# Patient Record
Sex: FEMALE | Race: WHITE | NOT HISPANIC OR LATINO | URBAN - METROPOLITAN AREA
[De-identification: names, ages, dates, MRNs, and addresses within clinical notes are randomized per-mention and may not be internally consistent; named-entity substitution may affect disease eponyms.]

---

## 2021-07-28 ENCOUNTER — OFFICE VISIT (OUTPATIENT)
Dept: URBAN - METROPOLITAN AREA CLINIC 70 | Facility: CLINIC | Age: 81
End: 2021-07-28
Payer: MEDICARE

## 2021-07-28 ENCOUNTER — LAB OUTSIDE AN ENCOUNTER (OUTPATIENT)
Dept: URBAN - METROPOLITAN AREA CLINIC 70 | Facility: CLINIC | Age: 81
End: 2021-07-28

## 2021-07-28 ENCOUNTER — WEB ENCOUNTER (OUTPATIENT)
Dept: URBAN - METROPOLITAN AREA CLINIC 70 | Facility: CLINIC | Age: 81
End: 2021-07-28

## 2021-07-28 VITALS
TEMPERATURE: 97.9 F | DIASTOLIC BLOOD PRESSURE: 67 MMHG | WEIGHT: 126 LBS | BODY MASS INDEX: 20.25 KG/M2 | SYSTOLIC BLOOD PRESSURE: 112 MMHG | HEART RATE: 84 BPM | HEIGHT: 66 IN

## 2021-07-28 DIAGNOSIS — K59.09 CHRONIC CONSTIPATION: ICD-10-CM

## 2021-07-28 DIAGNOSIS — K59.00 COLONIC CONSTIPATION: ICD-10-CM

## 2021-07-28 DIAGNOSIS — R63.4 WEIGHT LOSS: ICD-10-CM

## 2021-07-28 DIAGNOSIS — D13.2 ADENOMATOUS DUODENAL POLYP: ICD-10-CM

## 2021-07-28 DIAGNOSIS — R10.12 LEFT UPPER QUADRANT PAIN: ICD-10-CM

## 2021-07-28 PROCEDURE — 99214 OFFICE O/P EST MOD 30 MIN: CPT | Performed by: INTERNAL MEDICINE

## 2021-07-28 RX ORDER — ATORVASTATIN CALCIUM 40 MG/1
TAKE 1 TABLET (40 MG) BY ORAL ROUTE ONCE DAILY TABLET, FILM COATED ORAL 1
Qty: 0 | Refills: 0 | Status: ACTIVE | COMMUNITY
Start: 1900-01-01

## 2021-07-28 RX ORDER — ZOLPIDEM TARTRATE 10 MG/1
TAKE 1 TABLET (10 MG) BY ORAL ROUTE ONCE DAILY AT BEDTIME TABLET, FILM COATED ORAL 1
Qty: 0 | Refills: 0 | Status: ACTIVE | COMMUNITY
Start: 1900-01-01

## 2021-07-28 RX ORDER — MULTIVITAMIN WITH IRON
TAKE 1 TABLET BY ORAL ROUTE DAILY TABLET ORAL 1
Qty: 0 | Refills: 0 | Status: ACTIVE | COMMUNITY
Start: 1900-01-01

## 2021-07-28 RX ORDER — AMITRIPTYLINE HYDROCHLORIDE 25 MG/1
TAKE 1 TABLET (25 MG) BY ORAL ROUTE ONCE DAILY AT BEDTIME TABLET, FILM COATED ORAL 1
Qty: 0 | Refills: 0 | Status: ACTIVE | COMMUNITY
Start: 1900-01-01

## 2021-07-28 RX ORDER — ASPIRIN 81 MG/1
TAKE 1 TABLET (81 MG) BY ORAL ROUTE ONCE DAILY TABLET, COATED ORAL 1
Qty: 0 | Refills: 0 | Status: ACTIVE | COMMUNITY
Start: 1900-01-01

## 2021-07-28 RX ORDER — GABAPENTIN 300 MG/1
TAKE 1 CAPSULE (300 MG) BY ORAL ROUTE 3 TIMES PER DAY CAPSULE ORAL
Qty: 0 | Refills: 0 | Status: ACTIVE | COMMUNITY
Start: 1900-01-01

## 2021-07-28 RX ORDER — HYDROCODONE BITARTRATE AND ACETAMINOPHEN 10; 325 MG/1; MG/1
TAKE 1 TABLET BY ORAL ROUTE EVERY 4-6 HOURS AS NEEDED FOR PAIN TABLET ORAL
Qty: 0 | Refills: 0 | Status: ACTIVE | COMMUNITY
Start: 1900-01-01

## 2021-07-28 RX ORDER — PANTOPRAZOLE SODIUM 40 MG
TAKE 1 TABLET (40 MG) BY ORAL ROUTE 2 TIMES PER DAY TABLET, DELAYED RELEASE (ENTERIC COATED) ORAL 2
Qty: 0 | Refills: 0 | Status: ACTIVE | COMMUNITY
Start: 1900-01-01

## 2021-07-28 RX ORDER — METFORMIN HYDROCHLORIDE 500 MG/1
TAKE 1 TABLET (500 MG) BY ORAL ROUTE 2 TIMES PER DAY WITH MORNING AND EVENING MEALS TABLET, COATED ORAL 2
Qty: 0 | Refills: 0 | Status: ACTIVE | COMMUNITY
Start: 1900-01-01

## 2021-07-28 RX ORDER — TIOTROPIUM BROMIDE 18 UG/1
INHALE 1 CAPSULE (18 MCG) BY INHALATION ROUTE ONCE DAILY CAPSULE ORAL; RESPIRATORY (INHALATION) 1
Qty: 1 | Refills: 0 | Status: ACTIVE | COMMUNITY
Start: 1900-01-01

## 2021-07-28 RX ORDER — CARVEDILOL 3.12 MG/1
TAKE 1 TABLET (3.125 MG) BY ORAL ROUTE 2 TIMES PER DAY WITH FOOD TABLET, FILM COATED ORAL 2
Qty: 0 | Refills: 0 | Status: ACTIVE | COMMUNITY
Start: 1900-01-01

## 2021-07-28 RX ORDER — ALBUTEROL SULFATE 90 UG/1
INHALE 1 - 2 PUFFS (90 - 180 MCG) BY INHALATION ROUTE EVERY 6 HOURS AS NEEDED AEROSOL, METERED RESPIRATORY (INHALATION)
Qty: 1 | Refills: 0 | Status: ACTIVE | COMMUNITY
Start: 1900-01-01

## 2021-07-28 RX ORDER — PANTOPRAZOLE SODIUM 40 MG
TAKE 1 TABLET (40 MG) BY ORAL ROUTE 2 TIMES PER DAY TABLET, DELAYED RELEASE (ENTERIC COATED) ORAL 2
OUTPATIENT

## 2021-07-28 NOTE — PHYSICAL EXAM GASTROINTESTINAL
Abdomen , soft, epigastric and periumbilical tender, nondistended , no guarding or rigidity , no masses palpable , normal bowel sounds , Liver and Spleen: no hepatosplenomegaly

## 2021-07-28 NOTE — HPI-TODAY'S VISIT:
Patient presents today for evaluation of complaints of epigastric pain that started 2 weeks ago.  Pain is described as a "fluterring sensation".  Feels pain is similar to previous history of perforated ulcer years ago.  Denies any aggravating or alleviating factors.  Denies use of NSAIDs or signs of GI blood loss.  Voices a 15 pound weight loss over the last 2 months.  Voices taste changes with eating.  Last EGD was in November 2019:  normal esophagus, variable Z-line, small Hiatal hernia and duodenal polyp.  It was recommended she repeat procedure in 4 months.    Also voices occasional constipation in which defecation may not occur for 1-2 days.  Takes Dulcolax helps "some" to improve this.  Last colonoscopy 10/2019 showed sigmoid diverticulosis and internal hemorrhoids.  Remainder of exam was normal.

## 2021-07-30 PROBLEM — 89362005: Status: ACTIVE | Noted: 2021-07-30

## 2021-07-30 PROBLEM — 301715003: Status: ACTIVE | Noted: 2021-07-30

## 2021-08-17 ENCOUNTER — OFFICE VISIT (OUTPATIENT)
Dept: URBAN - METROPOLITAN AREA SURGERY CENTER 24 | Facility: SURGERY CENTER | Age: 81
End: 2021-08-17
Payer: MEDICARE

## 2021-08-17 ENCOUNTER — CLAIMS CREATED FROM THE CLAIM WINDOW (OUTPATIENT)
Dept: URBAN - METROPOLITAN AREA CLINIC 4 | Facility: CLINIC | Age: 81
End: 2021-08-17
Payer: MEDICARE

## 2021-08-17 DIAGNOSIS — K31.89 GASTRIC FOVEOLAR HYPERPLASIA: ICD-10-CM

## 2021-08-17 DIAGNOSIS — K31.89 ACQUIRED DEFORMITY OF DUODENUM: ICD-10-CM

## 2021-08-17 DIAGNOSIS — R10.13 EPIGASTRIC ABDOMINAL PAIN: ICD-10-CM

## 2021-08-17 PROCEDURE — 88305 TISSUE EXAM BY PATHOLOGIST: CPT | Performed by: PATHOLOGY

## 2021-08-17 PROCEDURE — 43239 EGD BIOPSY SINGLE/MULTIPLE: CPT | Performed by: INTERNAL MEDICINE

## 2021-08-17 PROCEDURE — 88312 SPECIAL STAINS GROUP 1: CPT | Performed by: PATHOLOGY

## 2021-08-17 PROCEDURE — G8907 PT DOC NO EVENTS ON DISCHARG: HCPCS | Performed by: INTERNAL MEDICINE

## 2021-08-17 RX ORDER — ASPIRIN 81 MG/1
TAKE 1 TABLET (81 MG) BY ORAL ROUTE ONCE DAILY TABLET, COATED ORAL 1
Qty: 0 | Refills: 0 | Status: ACTIVE | COMMUNITY
Start: 1900-01-01

## 2021-08-17 RX ORDER — GABAPENTIN 300 MG/1
TAKE 1 CAPSULE (300 MG) BY ORAL ROUTE 3 TIMES PER DAY CAPSULE ORAL
Qty: 0 | Refills: 0 | Status: ACTIVE | COMMUNITY
Start: 1900-01-01

## 2021-08-17 RX ORDER — ATORVASTATIN CALCIUM 40 MG/1
TAKE 1 TABLET (40 MG) BY ORAL ROUTE ONCE DAILY TABLET, FILM COATED ORAL 1
Qty: 0 | Refills: 0 | Status: ACTIVE | COMMUNITY
Start: 1900-01-01

## 2021-08-17 RX ORDER — HYDROCODONE BITARTRATE AND ACETAMINOPHEN 10; 325 MG/1; MG/1
TAKE 1 TABLET BY ORAL ROUTE EVERY 4-6 HOURS AS NEEDED FOR PAIN TABLET ORAL
Qty: 0 | Refills: 0 | Status: ACTIVE | COMMUNITY
Start: 1900-01-01

## 2021-08-17 RX ORDER — TIOTROPIUM BROMIDE 18 UG/1
INHALE 1 CAPSULE (18 MCG) BY INHALATION ROUTE ONCE DAILY CAPSULE ORAL; RESPIRATORY (INHALATION) 1
Qty: 1 | Refills: 0 | Status: ACTIVE | COMMUNITY
Start: 1900-01-01

## 2021-08-17 RX ORDER — PANTOPRAZOLE SODIUM 40 MG
TAKE 1 TABLET (40 MG) BY ORAL ROUTE 2 TIMES PER DAY TABLET, DELAYED RELEASE (ENTERIC COATED) ORAL 2
Status: ACTIVE | COMMUNITY

## 2021-08-17 RX ORDER — ZOLPIDEM TARTRATE 10 MG/1
TAKE 1 TABLET (10 MG) BY ORAL ROUTE ONCE DAILY AT BEDTIME TABLET, FILM COATED ORAL 1
Qty: 0 | Refills: 0 | Status: ACTIVE | COMMUNITY
Start: 1900-01-01

## 2021-08-17 RX ORDER — MULTIVITAMIN WITH IRON
TAKE 1 TABLET BY ORAL ROUTE DAILY TABLET ORAL 1
Qty: 0 | Refills: 0 | Status: ACTIVE | COMMUNITY
Start: 1900-01-01

## 2021-08-17 RX ORDER — AMITRIPTYLINE HYDROCHLORIDE 25 MG/1
TAKE 1 TABLET (25 MG) BY ORAL ROUTE ONCE DAILY AT BEDTIME TABLET, FILM COATED ORAL 1
Qty: 0 | Refills: 0 | Status: ACTIVE | COMMUNITY
Start: 1900-01-01

## 2021-08-17 RX ORDER — CARVEDILOL 3.12 MG/1
TAKE 1 TABLET (3.125 MG) BY ORAL ROUTE 2 TIMES PER DAY WITH FOOD TABLET, FILM COATED ORAL 2
Qty: 0 | Refills: 0 | Status: ACTIVE | COMMUNITY
Start: 1900-01-01

## 2021-08-17 RX ORDER — METFORMIN HYDROCHLORIDE 500 MG/1
TAKE 1 TABLET (500 MG) BY ORAL ROUTE 2 TIMES PER DAY WITH MORNING AND EVENING MEALS TABLET, COATED ORAL 2
Qty: 0 | Refills: 0 | Status: ACTIVE | COMMUNITY
Start: 1900-01-01

## 2021-08-17 RX ORDER — ALBUTEROL SULFATE 90 UG/1
INHALE 1 - 2 PUFFS (90 - 180 MCG) BY INHALATION ROUTE EVERY 6 HOURS AS NEEDED AEROSOL, METERED RESPIRATORY (INHALATION)
Qty: 1 | Refills: 0 | Status: ACTIVE | COMMUNITY
Start: 1900-01-01

## 2021-08-26 ENCOUNTER — OFFICE VISIT (OUTPATIENT)
Dept: URBAN - METROPOLITAN AREA CLINIC 70 | Facility: CLINIC | Age: 81
End: 2021-08-26
Payer: MEDICARE

## 2021-08-26 ENCOUNTER — WEB ENCOUNTER (OUTPATIENT)
Dept: URBAN - METROPOLITAN AREA CLINIC 70 | Facility: CLINIC | Age: 81
End: 2021-08-26

## 2021-08-26 VITALS
WEIGHT: 127 LBS | HEIGHT: 66 IN | TEMPERATURE: 97.7 F | DIASTOLIC BLOOD PRESSURE: 62 MMHG | SYSTOLIC BLOOD PRESSURE: 107 MMHG | HEART RATE: 87 BPM | BODY MASS INDEX: 20.41 KG/M2

## 2021-08-26 DIAGNOSIS — R10.13 DYSPEPSIA: ICD-10-CM

## 2021-08-26 DIAGNOSIS — D13.2 ADENOMATOUS DUODENAL POLYP: ICD-10-CM

## 2021-08-26 DIAGNOSIS — K59.00 COLONIC CONSTIPATION: ICD-10-CM

## 2021-08-26 PROBLEM — 73861008: Status: ACTIVE | Noted: 2021-07-30

## 2021-08-26 PROBLEM — 35298007: Status: ACTIVE | Noted: 2021-07-28

## 2021-08-26 PROCEDURE — 99213 OFFICE O/P EST LOW 20 MIN: CPT | Performed by: NURSE PRACTITIONER

## 2021-08-26 RX ORDER — HYDROCODONE BITARTRATE AND ACETAMINOPHEN 10; 325 MG/1; MG/1
TAKE 1 TABLET BY ORAL ROUTE EVERY 4-6 HOURS AS NEEDED FOR PAIN TABLET ORAL
Qty: 0 | Refills: 0 | Status: ACTIVE | COMMUNITY
Start: 1900-01-01

## 2021-08-26 RX ORDER — PANTOPRAZOLE SODIUM 40 MG
TAKE 1 TABLET (40 MG) BY ORAL ROUTE 2 TIMES PER DAY TABLET, DELAYED RELEASE (ENTERIC COATED) ORAL 2
Status: ACTIVE | COMMUNITY

## 2021-08-26 RX ORDER — AMITRIPTYLINE HYDROCHLORIDE 25 MG/1
TAKE 1 TABLET (25 MG) BY ORAL ROUTE ONCE DAILY AT BEDTIME TABLET, FILM COATED ORAL 1
Qty: 0 | Refills: 0 | Status: ACTIVE | COMMUNITY
Start: 1900-01-01

## 2021-08-26 RX ORDER — ZOLPIDEM TARTRATE 10 MG/1
TAKE 1 TABLET (10 MG) BY ORAL ROUTE ONCE DAILY AT BEDTIME TABLET, FILM COATED ORAL 1
Qty: 0 | Refills: 0 | Status: ACTIVE | COMMUNITY
Start: 1900-01-01

## 2021-08-26 RX ORDER — CARVEDILOL 3.12 MG/1
TAKE 1 TABLET (3.125 MG) BY ORAL ROUTE 2 TIMES PER DAY WITH FOOD TABLET, FILM COATED ORAL 2
Qty: 0 | Refills: 0 | Status: ACTIVE | COMMUNITY
Start: 1900-01-01

## 2021-08-26 RX ORDER — TIOTROPIUM BROMIDE 18 UG/1
INHALE 1 CAPSULE (18 MCG) BY INHALATION ROUTE ONCE DAILY CAPSULE ORAL; RESPIRATORY (INHALATION) 1
Qty: 1 | Refills: 0 | Status: ACTIVE | COMMUNITY
Start: 1900-01-01

## 2021-08-26 RX ORDER — GABAPENTIN 300 MG/1
TAKE 1 CAPSULE (300 MG) BY ORAL ROUTE 3 TIMES PER DAY CAPSULE ORAL
Qty: 0 | Refills: 0 | Status: ACTIVE | COMMUNITY
Start: 1900-01-01

## 2021-08-26 RX ORDER — ATORVASTATIN CALCIUM 40 MG/1
TAKE 1 TABLET (40 MG) BY ORAL ROUTE ONCE DAILY TABLET, FILM COATED ORAL 1
Qty: 0 | Refills: 0 | Status: ACTIVE | COMMUNITY
Start: 1900-01-01

## 2021-08-26 RX ORDER — ASPIRIN 81 MG/1
TAKE 1 TABLET (81 MG) BY ORAL ROUTE ONCE DAILY TABLET, COATED ORAL 1
Qty: 0 | Refills: 0 | Status: ACTIVE | COMMUNITY
Start: 1900-01-01

## 2021-08-26 RX ORDER — METFORMIN HYDROCHLORIDE 500 MG/1
TAKE 1 TABLET (500 MG) BY ORAL ROUTE 2 TIMES PER DAY WITH MORNING AND EVENING MEALS TABLET, COATED ORAL 2
Qty: 0 | Refills: 0 | Status: ACTIVE | COMMUNITY
Start: 1900-01-01

## 2021-08-26 RX ORDER — MULTIVITAMIN WITH IRON
TAKE 1 TABLET BY ORAL ROUTE DAILY TABLET ORAL 1
Qty: 0 | Refills: 0 | Status: ACTIVE | COMMUNITY
Start: 1900-01-01

## 2021-08-26 RX ORDER — PANTOPRAZOLE SODIUM 40 MG
TAKE 1 TABLET (40 MG) BY ORAL ROUTE 2 TIMES PER DAY TABLET, DELAYED RELEASE (ENTERIC COATED) ORAL 2
OUTPATIENT

## 2021-08-26 RX ORDER — ALBUTEROL SULFATE 90 UG/1
INHALE 1 - 2 PUFFS (90 - 180 MCG) BY INHALATION ROUTE EVERY 6 HOURS AS NEEDED AEROSOL, METERED RESPIRATORY (INHALATION)
Qty: 1 | Refills: 0 | Status: ACTIVE | COMMUNITY
Start: 1900-01-01

## 2021-08-26 NOTE — HPI-TODAY'S VISIT:
Patient presents today for procedure follow up after undergoing an EGD on 8/17/21 due to c/o LUQ and surveillance given hx of duodenal polyp.  EGD showed normal esophagus, gastritis (neg HP) and normal duodenum without signs of duodenal polyp.  CT A/P was also ordered due to change in bowel habis.  Image on 8/4/21 showed a small to moderate sized hiatal hernia, moderate distal esophagitis and proximal gastric wall thickening.  She is currently without UGI complaints.  Remains on pantorpazole 80 mg a day.   Bowel habits improve with use of fiber regimen.  Defecation occurs daily, up to 3x/day, with its use.  Takes this as needed.  Overall, she is without complaints.

## 2021-08-26 NOTE — HPI-OTHER HISTORIES
Office note from [facility] 7/28/21: Patient presents today for evaluation of complaints of epigastric pain that started 2 weeks ago.  Pain is described as a "fluterring sensation".  Feels pain is similar to previous history of perforated ulcer years ago.  Denies any aggravating or alleviating factors.  Denies use of NSAIDs or signs of GI blood loss.  Voices a 15 pound weight loss over the last 2 months.  Voices taste changes with eating.  Last EGD was in November 2019:  normal esophagus, variable Z-line, small Hiatal hernia and duodenal polyp.  It was recommended she repeat procedure in 4 months.    Also voices occasional constipation in which defecation may not occur for 1-2 days.  Takes Dulcolax helps "some" to improve this.  Last colonoscopy 10/2019 showed sigmoid diverticulosis and internal hemorrhoids.  Remainder of exam was normal.

## 2021-08-30 ENCOUNTER — OFFICE VISIT (OUTPATIENT)
Dept: URBAN - METROPOLITAN AREA SURGERY CENTER 24 | Facility: SURGERY CENTER | Age: 81
End: 2021-08-30

## 2021-11-23 ENCOUNTER — DASHBOARD ENCOUNTERS (OUTPATIENT)
Age: 81
End: 2021-11-23

## 2021-11-29 ENCOUNTER — OFFICE VISIT (OUTPATIENT)
Dept: URBAN - METROPOLITAN AREA CLINIC 70 | Facility: CLINIC | Age: 81
End: 2021-11-29

## 2021-11-29 RX ORDER — ALBUTEROL SULFATE 90 UG/1
INHALE 1 - 2 PUFFS (90 - 180 MCG) BY INHALATION ROUTE EVERY 6 HOURS AS NEEDED AEROSOL, METERED RESPIRATORY (INHALATION)
Qty: 1 | Refills: 0 | Status: ACTIVE | COMMUNITY
Start: 1900-01-01

## 2021-11-29 RX ORDER — TIOTROPIUM BROMIDE 18 UG/1
INHALE 1 CAPSULE (18 MCG) BY INHALATION ROUTE ONCE DAILY CAPSULE ORAL; RESPIRATORY (INHALATION) 1
Qty: 1 | Refills: 0 | Status: ACTIVE | COMMUNITY
Start: 1900-01-01

## 2021-11-29 RX ORDER — PANTOPRAZOLE SODIUM 40 MG
TAKE 1 TABLET (40 MG) BY ORAL ROUTE 2 TIMES PER DAY TABLET, DELAYED RELEASE (ENTERIC COATED) ORAL 2
Status: ACTIVE | COMMUNITY

## 2021-11-29 RX ORDER — MULTIVITAMIN WITH IRON
TAKE 1 TABLET BY ORAL ROUTE DAILY TABLET ORAL 1
Qty: 0 | Refills: 0 | Status: ACTIVE | COMMUNITY
Start: 1900-01-01

## 2021-11-29 RX ORDER — AMITRIPTYLINE HYDROCHLORIDE 25 MG/1
TAKE 1 TABLET (25 MG) BY ORAL ROUTE ONCE DAILY AT BEDTIME TABLET, FILM COATED ORAL 1
Qty: 0 | Refills: 0 | Status: ACTIVE | COMMUNITY
Start: 1900-01-01

## 2021-11-29 RX ORDER — ZOLPIDEM TARTRATE 10 MG/1
TAKE 1 TABLET (10 MG) BY ORAL ROUTE ONCE DAILY AT BEDTIME TABLET, FILM COATED ORAL 1
Qty: 0 | Refills: 0 | Status: ACTIVE | COMMUNITY
Start: 1900-01-01

## 2021-11-29 RX ORDER — METFORMIN HYDROCHLORIDE 500 MG/1
TAKE 1 TABLET (500 MG) BY ORAL ROUTE 2 TIMES PER DAY WITH MORNING AND EVENING MEALS TABLET, COATED ORAL 2
Qty: 0 | Refills: 0 | Status: ACTIVE | COMMUNITY
Start: 1900-01-01

## 2021-11-29 RX ORDER — ASPIRIN 81 MG/1
TAKE 1 TABLET (81 MG) BY ORAL ROUTE ONCE DAILY TABLET, COATED ORAL 1
Qty: 0 | Refills: 0 | Status: ACTIVE | COMMUNITY
Start: 1900-01-01

## 2021-11-29 RX ORDER — ATORVASTATIN CALCIUM 40 MG/1
TAKE 1 TABLET (40 MG) BY ORAL ROUTE ONCE DAILY TABLET, FILM COATED ORAL 1
Qty: 0 | Refills: 0 | Status: ACTIVE | COMMUNITY
Start: 1900-01-01

## 2021-11-29 RX ORDER — GABAPENTIN 300 MG/1
TAKE 1 CAPSULE (300 MG) BY ORAL ROUTE 3 TIMES PER DAY CAPSULE ORAL
Qty: 0 | Refills: 0 | Status: ACTIVE | COMMUNITY
Start: 1900-01-01

## 2021-11-29 RX ORDER — HYDROCODONE BITARTRATE AND ACETAMINOPHEN 10; 325 MG/1; MG/1
TAKE 1 TABLET BY ORAL ROUTE EVERY 4-6 HOURS AS NEEDED FOR PAIN TABLET ORAL
Qty: 0 | Refills: 0 | Status: ACTIVE | COMMUNITY
Start: 1900-01-01

## 2021-11-29 RX ORDER — CARVEDILOL 3.12 MG/1
TAKE 1 TABLET (3.125 MG) BY ORAL ROUTE 2 TIMES PER DAY WITH FOOD TABLET, FILM COATED ORAL 2
Qty: 0 | Refills: 0 | Status: ACTIVE | COMMUNITY
Start: 1900-01-01